# Patient Record
Sex: MALE
[De-identification: names, ages, dates, MRNs, and addresses within clinical notes are randomized per-mention and may not be internally consistent; named-entity substitution may affect disease eponyms.]

---

## 2019-12-26 ENCOUNTER — HOSPITAL ENCOUNTER (INPATIENT)
Dept: HOSPITAL 92 - ERS | Age: 19
LOS: 4 days | Discharge: HOME | DRG: 918 | End: 2019-12-30
Attending: EMERGENCY MEDICINE | Admitting: INTERNAL MEDICINE
Payer: MEDICAID

## 2019-12-26 VITALS — BODY MASS INDEX: 19.5 KG/M2

## 2019-12-26 DIAGNOSIS — E87.6: ICD-10-CM

## 2019-12-26 DIAGNOSIS — E87.2: ICD-10-CM
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
## 2019-12-26 DIAGNOSIS — F32.9: ICD-10-CM

## 2019-12-26 DIAGNOSIS — K75.89: ICD-10-CM

## 2019-12-26 DIAGNOSIS — T39.1X2A: Primary | ICD-10-CM

## 2019-12-26 DIAGNOSIS — R74.0: ICD-10-CM

## 2019-12-26 DIAGNOSIS — D68.9: ICD-10-CM

## 2019-12-26 DIAGNOSIS — D72.829: ICD-10-CM

## 2019-12-26 DIAGNOSIS — E86.0: ICD-10-CM

## 2019-12-26 LAB
ALBUMIN SERPL BCG-MCNC: 3.9 G/DL (ref 3.5–5)
ALBUMIN SERPL BCG-MCNC: 4.2 G/DL (ref 3.5–5)
ALP SERPL-CCNC: 49 U/L (ref 40–110)
ALP SERPL-CCNC: 61 U/L (ref 40–110)
ALT SERPL W P-5'-P-CCNC: 140 U/L (ref 8–55)
ALT SERPL W P-5'-P-CCNC: 56 U/L (ref 8–55)
ANION GAP SERPL CALC-SCNC: 12 MMOL/L (ref 10–20)
ANION GAP SERPL CALC-SCNC: 15 MMOL/L (ref 10–20)
APAP SERPL-MCNC: 35 MCG/ML (ref 10–30)
APTT PPP: 32.6 SEC (ref 22.9–36.1)
AST SERPL-CCNC: 34 U/L (ref 5–34)
AST SERPL-CCNC: 90 U/L (ref 5–34)
BASOPHILS # BLD AUTO: 0 THOU/UL (ref 0–0.2)
BASOPHILS NFR BLD AUTO: 0.1 % (ref 0–1)
BILIRUB SERPL-MCNC: 1.1 MG/DL (ref 0.2–1.2)
BILIRUB SERPL-MCNC: 2.4 MG/DL (ref 0.2–1.2)
BUN SERPL-MCNC: 10 MG/DL (ref 8.9–20.6)
BUN SERPL-MCNC: 12 MG/DL (ref 8.9–20.6)
CALCIUM SERPL-MCNC: 7.9 MG/DL (ref 7.8–10.44)
CALCIUM SERPL-MCNC: 8.7 MG/DL (ref 7.8–10.44)
CHLORIDE SERPL-SCNC: 107 MMOL/L (ref 98–107)
CHLORIDE SERPL-SCNC: 113 MMOL/L (ref 98–107)
CO2 SERPL-SCNC: 19 MMOL/L (ref 22–29)
CO2 SERPL-SCNC: 23 MMOL/L (ref 22–29)
CREAT CL PREDICTED SERPL C-G-VRATE: 119 ML/MIN (ref 70–130)
CREAT CL PREDICTED SERPL C-G-VRATE: 128 ML/MIN (ref 70–130)
EOSINOPHIL # BLD AUTO: 0.1 THOU/UL (ref 0–0.7)
EOSINOPHIL NFR BLD AUTO: 0.5 % (ref 0–10)
GLOBULIN SER CALC-MCNC: 2.2 G/DL (ref 2.4–3.5)
GLOBULIN SER CALC-MCNC: 2.6 G/DL (ref 2.4–3.5)
GLUCOSE SERPL-MCNC: 106 MG/DL (ref 70–105)
GLUCOSE SERPL-MCNC: 97 MG/DL (ref 70–105)
HGB BLD-MCNC: 16.7 G/DL (ref 14–18)
INR PPP: 1.4
INR PPP: 1.6
LYMPHOCYTES # BLD: 2 THOU/UL (ref 1.2–3.4)
LYMPHOCYTES NFR BLD AUTO: 12.9 % (ref 21–51)
MCH RBC QN AUTO: 31.6 PG (ref 27–31)
MCV RBC AUTO: 92.1 FL (ref 78–98)
MONOCYTES # BLD AUTO: 1.3 THOU/UL (ref 0.11–0.59)
MONOCYTES NFR BLD AUTO: 8.5 % (ref 0–10)
NEUTROPHILS # BLD AUTO: 12.4 THOU/UL (ref 1.4–6.5)
NEUTROPHILS NFR BLD AUTO: 78 % (ref 42–75)
PLATELET # BLD AUTO: 346 THOU/UL (ref 130–400)
POTASSIUM SERPL-SCNC: 3.7 MMOL/L (ref 3.5–5.1)
POTASSIUM SERPL-SCNC: 4.3 MMOL/L (ref 3.5–5.1)
PROTHROMBIN TIME: 16.7 SEC (ref 12–14.7)
PROTHROMBIN TIME: 19.3 SEC (ref 12–14.7)
RBC # BLD AUTO: 5.3 MILL/UL (ref 4.7–6.1)
SALICYLATES SERPL-MCNC: (no result) MG/DL (ref 15–30)
SODIUM SERPL-SCNC: 140 MMOL/L (ref 136–145)
SODIUM SERPL-SCNC: 141 MMOL/L (ref 136–145)
WBC # BLD AUTO: 15.9 THOU/UL (ref 4.8–10.8)

## 2019-12-26 PROCEDURE — 96365 THER/PROPH/DIAG IV INF INIT: CPT

## 2019-12-26 PROCEDURE — S0028 INJECTION, FAMOTIDINE, 20 MG: HCPCS

## 2019-12-26 PROCEDURE — 85610 PROTHROMBIN TIME: CPT

## 2019-12-26 PROCEDURE — 36415 COLL VENOUS BLD VENIPUNCTURE: CPT

## 2019-12-26 PROCEDURE — 82140 ASSAY OF AMMONIA: CPT

## 2019-12-26 PROCEDURE — 96366 THER/PROPH/DIAG IV INF ADDON: CPT

## 2019-12-26 PROCEDURE — 80048 BASIC METABOLIC PNL TOTAL CA: CPT

## 2019-12-26 PROCEDURE — 80076 HEPATIC FUNCTION PANEL: CPT

## 2019-12-26 PROCEDURE — 85730 THROMBOPLASTIN TIME PARTIAL: CPT

## 2019-12-26 PROCEDURE — 80307 DRUG TEST PRSMV CHEM ANLYZR: CPT

## 2019-12-26 PROCEDURE — 85025 COMPLETE CBC W/AUTO DIFF WBC: CPT

## 2019-12-26 PROCEDURE — 80053 COMPREHEN METABOLIC PANEL: CPT

## 2019-12-26 RX ADMIN — ACETYLCYSTEINE SCH MG: 200 SOLUTION ORAL; RESPIRATORY (INHALATION) at 19:03

## 2019-12-26 RX ADMIN — ACETYLCYSTEINE SCH MG: 200 SOLUTION ORAL; RESPIRATORY (INHALATION) at 16:31

## 2019-12-26 RX ADMIN — FAMOTIDINE SCH: 10 INJECTION, SOLUTION INTRAVENOUS at 20:15

## 2019-12-26 RX ADMIN — THERA TABS SCH TAB: TAB at 09:39

## 2019-12-26 RX ADMIN — ACETYLCYSTEINE SCH MG: 200 SOLUTION ORAL; RESPIRATORY (INHALATION) at 23:41

## 2019-12-26 RX ADMIN — FAMOTIDINE SCH: 10 INJECTION, SOLUTION INTRAVENOUS at 09:40

## 2019-12-26 RX ADMIN — ACETYLCYSTEINE SCH MG: 200 SOLUTION ORAL; RESPIRATORY (INHALATION) at 12:56

## 2019-12-26 NOTE — CON
DATE OF CONSULTATION:  12/26/2019



CHIEF COMPLAINT:  Tylenol overdose.



HISTORY OF PRESENT ILLNESS:  Mr. Pimentel is a 19-year-old man who states that he drank

a bottle of NyQuil 2 nights ago and then took 5-10 Tylenol pills.  He woke up the

next morning and was in despair over breaking up with his girlfriend and he then

progressed to take the rest of the Tylenol bottle at 1:30 yesterday afternoon.  As

the evening progressed after that he developed lower abdominal cramping pain, nausea

and vomiting and so went on to the emergency room about 11:30.  Tylenol level at

12:30 a.m. this morning was 95, times that was 11 hours after ingestion.  Tylenol.

Tylenol level at 6:30 a.m. this morning, which was 15 hours after ingestion was at

level of 35.  The patient has had some tenderness over the right upper quadrant.  He

had one running bowel movement yesterday.  No blood in the stool.  No bloody emesis.

 No further vomiting today. 



PAST MEDICAL HISTORY:  Depression and prior suicide attempt with acetaminophen in

the past. 



PAST SURGICAL HISTORY:  

1. Foot surgery.

2. An implant over his left cheekbone.  

3. He has had LASIK eye surgery.



FAMILY HISTORY:  Negative for GI malignancy or liver disease.



SOCIAL HISTORY:  No alcohol, tobacco, or drugs.



ALLERGIES:  NO KNOWN DRUG ALLERGIES.



MEDICATIONS:  As an outpatient prior to admission, none.



REVIEW OF SYSTEMS:  Negative x10 systems reviewed, except as stated in history of

present illness. 



PHYSICAL EXAMINATION:

VITAL SIGNS:  Temperature 98.4, pulse 76, blood pressure 139/77. 

GENERAL:  He is in no acute distress.  Alert and oriented x3. 

HEENT:  His eyes have no scleral icterus.  Oropharynx is clear without lesions.  No

asterixis. 

LUNGS:  Clear to auscultation bilaterally. 

HEART:  Regular rate and rhythm without murmur. 

ABDOMEN:  Soft.  He has tenderness in the epigastric region and right upper quadrant

without guarding.  His bowel sounds are present. 

NEUROLOGICAL:  Mental status is alert and oriented.



LABORATORY DATA:  Again acetaminophen level at 6:30 this morning was 35.

Acetaminophen level at 12:30 a.m. was 95.  Tylenol ingestion occurred at 1:30 p.m.

INR was 1.6 at 6:30 this morning.  Creatinine 0.73, bilirubin 2.4, AST 34, ALT 56,

albumin 3.9. 



IMPRESSION:  

1. Acute liver toxicity secondary to acetaminophen overdose taken for suicide

attempt after a break-up with his girlfriend.  He ingested some 2 nights ago and

then the majority of the acetaminophen yesterday at 1:30.  The acetaminophen

nomogram is consistent with probable hepatotoxicity based on a level of 95 at 17

hours after ingestion at a level of 35.  Concerning findings now are his elevated

INR at 1.6 and elevated bilirubin.  His transaminases are not significantly

elevated.  However, these may not directly correlate with the degree of injury.  At

this point, the important factors will be monitoring his mental status and the trend

of his INR and bilirubin.  I will recheck these labs now.  If they are increasing,

then we will need to have a low threshold for transfer to the ICU and transfer to a

liver transplant center. 



RECOMMENDATIONS:  

1. CBC, CMP, PT/INR now.

2. We will follow the trend of these labs and monitor his mental status.

3. Please note that the patient was started on oral N-acetylcysteine in [place]

prior to transfer.  He is now continuing to receive oral acetylcysteine q.4 hours.

He will need to complete the course of the Mucomyst. 





Job ID:  [id number]

## 2019-12-26 NOTE — HP
PRIMARY CARE PHYSICIAN:  [facility].



CHIEF COMPLAINT:  Drug overdose.



HISTORY OF PRESENT ILLNESS:  The patient is a 19-year-old male who presented to

[facility] after a drug overdose.  The patient had an argument 
with

his long-term girlfriend of 4 years, and he wanted to kill himself.  He has

swallowed almost the whole bottle of Tylenol.  Per ER record from [facility], he 
took

approximately 02979 mg of Tylenol.  He drove himself to the emergency room 
because

he does not want to die.  He has mild nausea at this time; however, denies any 
pain.

 No chest pain or palpitations reported.  He denies any other drug use. 



At [facility], his initial vital signs showed a pulse rate of 97, respirations 
of 16,

and blood pressure 128/78.  The patient was afebrile.  Please note that he 
swallowed

Tylenol around 1:30 p.m. yesterday.  His Tylenol level at [facility] at 0033 
hours

was 95.  He received 21 mL of 20% acetylcysteine solution and was transferred to

this facility. 



PAST MEDICAL HISTORY:  Reviewed with the patient.

1. Depression.

2. History of suicide attempt with Tylenol in the past.  He also has history of

inpatient psychiatry admissions. 



PAST SURGICAL HISTORY:  

1. Right foot surgery.

2. Left eye implant.



ALLERGIES:  NO KNOWN DRUG ALLERGIES.



CURRENT MEDICATIONS:  Reviewed with the patient and none.



SOCIAL HISTORY:  The patient denies any tobacco, alcohol, or drug use.  He 
currently

lives at home. 



FAMILY HISTORY:  Negative for heart disease.



REVIEW OF SYSTEMS:  All other review of systems were reviewed and were found

negative. 



PHYSICAL EXAMINATION:

VITAL SIGNS:  Current vital signs; temperature 98.4, respirations 17, pulse of 
81,

blood pressure of 138/73, and O2 saturation 98% on room air. 

GENERAL:  A 19-year-old male in no apparent distress.  Nausea, improving. 

HEENT:  Head, atraumatic and normocephalic.  Sclerae anicteric.  Moist mucous

membranes.  No oral lesion. 

NECK:  Supple.  No JVD.  No carotid bruit. 

LUNGS:  Clear to auscultation bilaterally.  No wheezing, rales, or rhonchi. 

HEART:  S1, S2 present.  Regular rate and rhythm.  No rubs or gallops. 

ABDOMEN:  Soft, nontender.  Bowel sounds present.  No rebound or guarding. 

EXTREMITIES:  No edema or calf tenderness. 

NEUROLOGIC:  Grossly nonfocal.  Moves all 4 extremities. 

PSYCHIATRY:  Alert, awake, and oriented x3.  Flat affect. 

SKIN:  Warm and dry. 

LYMPH NODES:  No palpable lymph nodes in the neck.



LABORATORY DATA:  Labs from [facility] at 0033 hours; sodium 140, potassium 3.5,

chloride 104, bicarb 16, glucose of 97, BUN of 11, and creatinine of 1. 



Total bilirubin 1.8, alkaline phosphatase 60, AST of 39, ALT of 67.  Lactic 
acid was

5.  WBC 14.6 with hemoglobin 17, hematocrit 49, and platelets of 407. 



PT of 15.7, INR 1.2, and PTT 27.5.  Urine drug screen was positive for benzos.

Tylenol level 95.  Alcohol level was negative.  Salicylate was negative.  
Urinalysis

was negative for wbc or bacteria. 



EKG by my review showed sinus rhythm. 



His Tylenol level at this facility was 35.  His PT was 19.3 with INR of 1.6 at 
this

facility. 



Total bilirubin at this facility was 2.4.



IMPRESSION:  

1. Drug overdose with acetaminophen/suicidal attempt.

2. History of suicidal attempt with Tylenol in the past.

3. Dehydration with lactic acidosis, secondary to persistent nausea, vomiting.

4. Anion gap metabolic acidosis, secondary to lactic acidosis.  Lactic acid 
improved

to 2.0. 

5. Leukocytosis, unlikely to be infectious.

6. Abnormal LFTs with coagulopathy, probably secondary to hepatotoxicity.



PLAN:  The patient will be monitored on the medical floor.  The patient has been

started on acetylcysteine protocol.  He has mild nausea; however, denies

any vomiting.  We will continue the oral protocol that was started at 
[facility].  We

will consult Gastroenterology due to abnormal LFTs.  We will recheck LFTs and

Tylenol level in a.m.  PT/INR in a.m. Poison control was called from [facility].

Sitter with suicide precautions. IV hydration.





Job ID:  [id number]



[facility]

## 2019-12-27 LAB
ALBUMIN SERPL BCG-MCNC: 3.9 G/DL (ref 3.5–5)
ALP SERPL-CCNC: 51 U/L (ref 40–110)
ALT SERPL W P-5'-P-CCNC: 161 U/L (ref 8–55)
ANION GAP SERPL CALC-SCNC: 12 MMOL/L (ref 10–20)
APAP SERPL-MCNC: (no result) MCG/ML (ref 10–30)
APTT PPP: 31.9 SEC (ref 22.9–36.1)
AST SERPL-CCNC: 85 U/L (ref 5–34)
BASOPHILS # BLD AUTO: 0.1 THOU/UL (ref 0–0.2)
BASOPHILS NFR BLD AUTO: 0.4 % (ref 0–1)
BILIRUB DIRECT SERPL-MCNC: 0.4 MG/DL (ref 0.1–0.3)
BILIRUB SERPL-MCNC: 0.8 MG/DL (ref 0.2–1.2)
BUN SERPL-MCNC: 7 MG/DL (ref 8.9–20.6)
CALCIUM SERPL-MCNC: 8.3 MG/DL (ref 7.8–10.44)
CHLORIDE SERPL-SCNC: 109 MMOL/L (ref 98–107)
CO2 SERPL-SCNC: 23 MMOL/L (ref 22–29)
CREAT CL PREDICTED SERPL C-G-VRATE: 130 ML/MIN (ref 70–130)
EOSINOPHIL # BLD AUTO: 0.1 THOU/UL (ref 0–0.7)
EOSINOPHIL NFR BLD AUTO: 0.6 % (ref 0–10)
GLUCOSE SERPL-MCNC: 114 MG/DL (ref 70–105)
HGB BLD-MCNC: 15.8 G/DL (ref 14–18)
INR PPP: 1.3
LYMPHOCYTES # BLD: 2.1 THOU/UL (ref 1.2–3.4)
LYMPHOCYTES NFR BLD AUTO: 15 % (ref 21–51)
MCH RBC QN AUTO: 31.4 PG (ref 27–31)
MCV RBC AUTO: 91.7 FL (ref 78–98)
MONOCYTES # BLD AUTO: 1 THOU/UL (ref 0.11–0.59)
MONOCYTES NFR BLD AUTO: 7.4 % (ref 0–10)
NEUTROPHILS # BLD AUTO: 10.7 THOU/UL (ref 1.4–6.5)
NEUTROPHILS NFR BLD AUTO: 76.7 % (ref 42–75)
PLATELET # BLD AUTO: 292 THOU/UL (ref 130–400)
POTASSIUM SERPL-SCNC: 3.6 MMOL/L (ref 3.5–5.1)
PROTHROMBIN TIME: 16.3 SEC (ref 12–14.7)
RBC # BLD AUTO: 5.02 MILL/UL (ref 4.7–6.1)
SODIUM SERPL-SCNC: 140 MMOL/L (ref 136–145)
WBC # BLD AUTO: 13.9 THOU/UL (ref 4.8–10.8)

## 2019-12-27 RX ADMIN — ONDANSETRON PRN MG: 2 INJECTION INTRAMUSCULAR; INTRAVENOUS at 08:37

## 2019-12-27 RX ADMIN — ACETYLCYSTEINE SCH MG: 200 SOLUTION ORAL; RESPIRATORY (INHALATION) at 11:56

## 2019-12-27 RX ADMIN — ONDANSETRON PRN MG: 2 INJECTION INTRAMUSCULAR; INTRAVENOUS at 15:02

## 2019-12-27 RX ADMIN — ACETYLCYSTEINE SCH MG: 200 SOLUTION ORAL; RESPIRATORY (INHALATION) at 19:39

## 2019-12-27 RX ADMIN — ACETYLCYSTEINE SCH MG: 200 SOLUTION ORAL; RESPIRATORY (INHALATION) at 23:56

## 2019-12-27 RX ADMIN — THERA TABS SCH: TAB at 11:18

## 2019-12-27 RX ADMIN — ACETYLCYSTEINE SCH MG: 200 SOLUTION ORAL; RESPIRATORY (INHALATION) at 06:56

## 2019-12-27 RX ADMIN — FAMOTIDINE SCH MG: 10 INJECTION, SOLUTION INTRAVENOUS at 08:37

## 2019-12-27 RX ADMIN — ACETYLCYSTEINE SCH MG: 200 SOLUTION ORAL; RESPIRATORY (INHALATION) at 02:49

## 2019-12-27 RX ADMIN — FAMOTIDINE SCH: 10 INJECTION, SOLUTION INTRAVENOUS at 20:56

## 2019-12-27 RX ADMIN — ACETYLCYSTEINE SCH MG: 200 SOLUTION ORAL; RESPIRATORY (INHALATION) at 11:18

## 2019-12-27 RX ADMIN — ACETYLCYSTEINE SCH MG: 200 SOLUTION ORAL; RESPIRATORY (INHALATION) at 15:32

## 2019-12-27 NOTE — PRG
DATE OF SERVICE:  12/27/2019



SUBJECTIVE:  The patient reports having nausea from the medication.  There is no

vomiting.  He is alert and lucid. 



OBJECTIVE:  VITAL SIGNS:  Temperature is 98.3, blood pressure 133/69, and pulse of

97. 

GENERAL:  He is alert, lying in bed, in no distress. 

HEENT:  Shows anicteric sclerae. 

CV:  Shows normal S1 and S2.  Regular rate and rhythm. 

CHEST:  Shows a breath sounds. 

ABDOMEN:  Soft.  There is right upper quadrant tenderness.  He has active bowel

sounds.  No peritoneal sign. 

EXTREMITIES:  Shows no edema.



LABORATORY DATA:  Electrolytes within normal range.  Creatinine 0.72, bilirubin 0.8,

alkaline phosphatase 51, , AST of 85, and serum ammonia of 32, PT of 16.3 and

INR of 1.3. 



ASSESSMENT:  

1. Tylenol overdose, status post 7/17 doses of Mucomyst.

2. Liver synthetic function appears to be intact, and the INR is trending down.

Bilirubin is down with mild elevation of his liver enzymes.  No evidence of severe

hepatonecrosis or encephalopathy.  Overall, the patient is improving. 



RECOMMENDATION:  

1. Continue to finish out the acetylcysteine treatment.

2. Advanced diet as tolerated.

3. Anticipate good prognosis.  Dr. Pritchard to cover for GI Service this weekend.







Job ID:  [id number]

## 2019-12-27 NOTE — PDOC.HOSPP
- Subjective


Encounter Date: 12/27/19


Encounter Time: 08:30


Subjective: 





Patient seen and examined for OD. Nausea. Dark stool earlier. No new 

complaints. No overnight events





- Objective


Vital Signs & Weight: 


 Vital Signs (12 hours)











  Temp Pulse Resp BP BP Pulse Ox


 


 12/27/19 12:00  98.3 F  97  18   133/69  99


 


 12/27/19 07:58       100


 


 12/27/19 07:46  98.5 F  77  16   131/60  100


 


 12/27/19 04:46  98.4 F  78  16  138/72   97








 Weight











Weight                         125 lb 0.034 oz














I&O: 


 











 12/26/19 12/27/19 12/28/19





 06:59 06:59 06:59


 


Intake Total  1680 


 


Balance  1680 











Result Diagrams: 


 12/27/19 04:40





 12/27/19 04:40


Additional Labs: 





 Laboratory Tests











  12/26/19 12/26/19 12/26/19





  06:27 06:27 20:57


 


Hgb   


 


INR    1.4


 


Total Bilirubin  2.4 H  


 


Direct Bilirubin   


 


AST   


 


ALT   


 


Acetaminophen   35.0 H 














  12/27/19 12/27/19 12/27/19





  04:40 04:40 04:40


 


Hgb   15.8 


 


INR    1.3


 


Total Bilirubin   


 


Direct Bilirubin   


 


AST   


 


ALT   


 


Acetaminophen  Less than  6.0 L  














  12/27/19





  04:40


 


Hgb 


 


INR 


 


Total Bilirubin  0.8


 


Direct Bilirubin  0.4 H


 


AST  85 H


 


ALT  161 H


 


Acetaminophen 














Hospitalist ROS





- Review of Systems


Respiratory: denies: cough, dry, shortness of breath, hemoptysis, SOB with 

excertion, pleuritic pain, sputum, wheezing, other


Cardiovascular: denies: chest pain, palpitations, orthopnea, paroxysmal noc. 

dyspnea, edema, light headedness, other





- Medication


Medications: 


Active Medications











Generic Name Dose Route Start Last Admin





  Trade Name Freq  PRN Reason Stop Dose Admin


 


Acetylcysteine  3,970 mg  12/26/19 11:00  12/27/19 11:56





  Mucomyst 20%  PO   3,970 mg





  Q4H RUBY   Administration





     





     





     





     


 


Calcium Carbonate  1,000 mg  12/26/19 08:18  12/26/19 17:17





  Tums  PO   1,000 mg





  Q4H PRN   Administration





  Heartburn  or Indigestion   





     





     





     


 


Famotidine  20 mg  12/26/19 09:00  12/27/19 08:37





  Pepcid  SLOW IVP   20 mg





  Q12HR RUBY   Administration





     





     





     





     


 


Famotidine  20 mg  12/26/19 09:00  12/27/19 09:33





  Pepcid  PO   Not Given





  BID RUBY   





     





     





     





     


 


Folic Acid  1 mg  12/26/19 09:00  12/27/19 11:18





  Folvite  PO   Not Given





  DAILY RUBY   





     





     





     





     


 


Promethazine HCl 25 mg/ Sodium  51 mls @ 102 mls/hr  12/26/19 08:22  12/27/19 10

:33





  Chloride  IVPB   51 mls





  Q6H PRN   Administration





  Nausea   





     





     





     


 


Dextrose/Sodium Chloride  1,000 mls @ 100 mls/hr  12/26/19 11:15  12/27/19 06:58





  D5 1/2 Ns  IV   1,000 mls





  .Q10H RUBY   Administration





     





     





     





     


 


Multivitamins  1 tab  12/26/19 09:00  12/27/19 11:18





  Theragran  PO   Not Given





  DAILY RUBY   





     





     





     





     


 


Ondansetron HCl  4 mg  12/26/19 08:18  12/27/19 08:37





  Zofran  IVP   4 mg





  Q6H PRN   Administration





  Nausea/Vomiting   





     





     





     


 


Phytonadione  5 mg  12/26/19 09:00  12/27/19 10:38





  Aquamephyton  PO  12/27/19 23:59  5 mg





  DAILY RUBY   Administration





     





     





     





     


 


Tramadol HCl  50 mg  12/26/19 13:33  12/26/19 15:38





  Ultram  PO   50 mg





  Q8H PRN   Administration





  Moderate Pain (4-6)   





     





     





     














- Exam


General Appearance: NAD


Neck: supple, no JVD


Heart: RRR, no gallops


Respiratory: CTAB, no rales


Gastrointestinal: soft, normal bowel sounds, no guarding, no rigidity


Gastrointestinal - other findings: mild gen tend


Extremities: no edema


Neurological: no new deficit





Hosp A/P





- Plan


DVT proph w/SCDs





Drug overdose with acetaminophen/suicidal attempt.


Dehydration with lactic acidosis, secondary to persistent nausea, vomiting.


Anion gap metabolic acidosis, secondary to lactic acidosis. 


Leukocytosis, unlikely to be infectious.


Abnormal LFTs with coagulopathy, probably secondary to hepatotoxicity.


History of suicidal attempt with Tylenol in the past.





PLAN:  


Cont acetylcysteine protocol


Bilirubin/Coagulopathy improving


GI input appreciated


Cont Sitter with Suicide precautions


AM labs

## 2019-12-28 LAB
ALBUMIN SERPL BCG-MCNC: 4.1 G/DL (ref 3.5–5)
ALP SERPL-CCNC: 52 U/L (ref 40–110)
ALT SERPL W P-5'-P-CCNC: 352 U/L (ref 8–55)
ANION GAP SERPL CALC-SCNC: 12 MMOL/L (ref 10–20)
APTT PPP: 29.3 SEC (ref 22.9–36.1)
AST SERPL-CCNC: 153 U/L (ref 5–34)
BASOPHILS # BLD AUTO: 0 THOU/UL (ref 0–0.2)
BASOPHILS NFR BLD AUTO: 0.3 % (ref 0–1)
BILIRUB SERPL-MCNC: 1.3 MG/DL (ref 0.2–1.2)
BUN SERPL-MCNC: 4 MG/DL (ref 8.9–20.6)
CALCIUM SERPL-MCNC: 8.9 MG/DL (ref 7.8–10.44)
CHLORIDE SERPL-SCNC: 107 MMOL/L (ref 98–107)
CO2 SERPL-SCNC: 26 MMOL/L (ref 22–29)
CREAT CL PREDICTED SERPL C-G-VRATE: 123 ML/MIN (ref 70–130)
EOSINOPHIL # BLD AUTO: 0.1 THOU/UL (ref 0–0.7)
EOSINOPHIL NFR BLD AUTO: 0.8 % (ref 0–10)
GLOBULIN SER CALC-MCNC: 2.5 G/DL (ref 2.4–3.5)
GLUCOSE SERPL-MCNC: 89 MG/DL (ref 70–105)
HGB BLD-MCNC: 15.6 G/DL (ref 14–18)
INR PPP: 1.1
LYMPHOCYTES # BLD: 1.4 THOU/UL (ref 1.2–3.4)
LYMPHOCYTES NFR BLD AUTO: 10.4 % (ref 21–51)
MCH RBC QN AUTO: 31 PG (ref 27–31)
MCV RBC AUTO: 91.8 FL (ref 78–98)
MONOCYTES # BLD AUTO: 1.4 THOU/UL (ref 0.11–0.59)
MONOCYTES NFR BLD AUTO: 9.9 % (ref 0–10)
NEUTROPHILS # BLD AUTO: 10.8 THOU/UL (ref 1.4–6.5)
NEUTROPHILS NFR BLD AUTO: 78.6 % (ref 42–75)
PLATELET # BLD AUTO: 284 THOU/UL (ref 130–400)
POTASSIUM SERPL-SCNC: 3.1 MMOL/L (ref 3.5–5.1)
PROTHROMBIN TIME: 14.4 SEC (ref 12–14.7)
RBC # BLD AUTO: 5.03 MILL/UL (ref 4.7–6.1)
SODIUM SERPL-SCNC: 142 MMOL/L (ref 136–145)
WBC # BLD AUTO: 13.7 THOU/UL (ref 4.8–10.8)

## 2019-12-28 RX ADMIN — ACETYLCYSTEINE SCH MG: 200 SOLUTION ORAL; RESPIRATORY (INHALATION) at 16:32

## 2019-12-28 RX ADMIN — ACETYLCYSTEINE SCH MG: 200 SOLUTION ORAL; RESPIRATORY (INHALATION) at 20:19

## 2019-12-28 RX ADMIN — FAMOTIDINE SCH: 10 INJECTION, SOLUTION INTRAVENOUS at 09:07

## 2019-12-28 RX ADMIN — THERA TABS SCH TAB: TAB at 09:06

## 2019-12-28 RX ADMIN — FAMOTIDINE SCH: 10 INJECTION, SOLUTION INTRAVENOUS at 20:21

## 2019-12-28 RX ADMIN — ACETYLCYSTEINE SCH MG: 200 SOLUTION ORAL; RESPIRATORY (INHALATION) at 06:09

## 2019-12-28 RX ADMIN — ONDANSETRON PRN MG: 2 INJECTION INTRAMUSCULAR; INTRAVENOUS at 09:10

## 2019-12-28 RX ADMIN — ACETYLCYSTEINE SCH MG: 200 SOLUTION ORAL; RESPIRATORY (INHALATION) at 03:04

## 2019-12-28 RX ADMIN — ACETYLCYSTEINE SCH MG: 200 SOLUTION ORAL; RESPIRATORY (INHALATION) at 11:26

## 2019-12-28 NOTE — PRG
DATE OF SERVICE:  12/28/2019



SUBJECTIVE:  This is a 19-year-old male with Tylenol overdose.  He is on Mucomyst

every 3 to 4 hours.  His appetite remains poor.  He is still complaining of nausea.

No vomiting.  He is not eating very well.  However, he has no abdominal pain. 



LABORATORY DATA:  His laboratory data from today show WBC of 13,700, hemoglobin of

15.6, hematocrit of 46.2.  Coagulation profile is normal; PT is 14.4, INR is 1.1.

Chemistry panel shows liver function tests trending up.  Bilirubin 1.3; ,

gone up from 85; ; alkaline phosphatase 52. 



OBJECTIVE:  GENERAL:  Appears comfortable, in no acute distress. 

VITAL SIGNS:  Afebrile, pulse is 84, blood pressure 133/73. 

CARDIOVASCULAR:  Within normal limits. 

LUNGS:  Within normal limits. 

ABDOMEN:  Soft.  Abdomen is nontender.  No organomegaly.  No masses.  Bowel sounds

normal. 

EXTREMITIES:  Reveal no edema.



RECOMMENDATIONS:  

1. Continue Mucomyst.

2. Increase oral intake.

3. Follow up LFTs.







Job ID:  [id number]

## 2019-12-28 NOTE — PDOC.HOSPP
- Subjective


Encounter Date: 12/28/19


Encounter Time: 14:48


Subjective: 





Mr. Pimentel was seen today in follow-up of Tylenol overdose. He says he continues 

to note nausea, and difficulty eating. He has only had a few crackers in the 

past 2 days. 





- Objective


Vital Signs & Weight: 


 Vital Signs (12 hours)











  Temp Pulse Resp BP Pulse Ox


 


 12/28/19 08:36  99.5 F  84  16  133/73  98


 


 12/28/19 08:00      98








 Weight











Admit Weight                   125 lb 0.034 oz


 


Weight                         125 lb 0.034 oz














I&O: 


 











 12/27/19 12/28/19 12/29/19





 06:59 06:59 06:59


 


Intake Total 1680 1440 


 


Balance 1680 1440 











Result Diagrams: 


 12/28/19 06:07





 12/28/19 06:07





Hospitalist ROS





- Medication


Medications: 


Active Medications











Generic Name Dose Route Start Last Admin





  Trade Name Freq  PRN Reason Stop Dose Admin


 


Acetylcysteine  3,970 mg  12/26/19 11:00  12/28/19 11:26





  Mucomyst 20%  PO   3,970 mg





  Q4H RUBY   Administration





     





     





     





     


 


Calcium Carbonate  1,000 mg  12/26/19 08:18  12/26/19 17:17





  Tums  PO   1,000 mg





  Q4H PRN   Administration





  Heartburn  or Indigestion   





     





     





     


 


Docusate Sodium  100 mg  12/27/19 21:00  12/28/19 09:06





  Colace  PO   100 mg





  BID RUBY   Administration





     





     





     





     


 


Famotidine  20 mg  12/26/19 09:00  12/28/19 09:07





  Pepcid  SLOW IVP   Not Given





  Q12HR RUBY   





     





     





     





     


 


Famotidine  20 mg  12/26/19 09:00  12/28/19 09:06





  Pepcid  PO   20 mg





  BID RUBY   Administration





     





     





     





     


 


Folic Acid  1 mg  12/26/19 09:00  12/28/19 09:06





  Folvite  PO   1 mg





  DAILY RUBY   Administration





     





     





     





     


 


Promethazine HCl 25 mg/ Sodium  51 mls @ 102 mls/hr  12/26/19 08:22  12/27/19 10

:33





  Chloride  IVPB   51 mls





  Q6H PRN   Administration





  Nausea   





     





     





     


 


Dextrose/Sodium Chloride  1,000 mls @ 100 mls/hr  12/26/19 11:15  12/28/19 06:08





  D5 1/2 Ns  IV   1,000 mls





  .Q10H RUBY   Administration





     





     





     





     


 


Multivitamins  1 tab  12/26/19 09:00  12/28/19 09:06





  Theragran  PO   1 tab





  DAILY RUBY   Administration





     





     





     





     


 


Ondansetron HCl  4 mg  12/26/19 08:18  12/28/19 03:06





  Zofran Odt  PO   4 mg





  Q6H PRN   Administration





  Nausea/Vomiting   





     





     





     


 


Ondansetron HCl  4 mg  12/26/19 08:18  12/28/19 09:10





  Zofran  IVP   4 mg





  Q6H PRN   Administration





  Nausea/Vomiting   





     





     





     


 


Promethazine HCl  25 mg  12/26/19 08:22  12/27/19 20:51





  Phenergan  PO   25 mg





  Q4H PRN   Administration





  Nausea   





     





     





     


 


Tramadol HCl  50 mg  12/26/19 13:33  12/26/19 15:38





  Ultram  PO   50 mg





  Q8H PRN   Administration





  Moderate Pain (4-6)   





     





     





     














- Exam


Eye: PERRL


Heart: RRR, no murmur, no gallops, no rubs, normal peripheral pulses


Respiratory: CTAB, no wheezes, no rales, no ronchi, normal chest expansion, no 

tachypnea, normal percussion


Gastrointestinal: soft, non-distended, normal bowel sounds (+ mild diffuse 

tenderness no rebound or guarding)





Hosp A/P


(1) Acetaminophen overdose


Code(s): T39.1X1A - POISONING BY 4-AMINOPHENOL DERIVATIVES, ACCIDENTAL, INIT   

Status: Acute   





(2) Depression


Code(s): F32.9 - MAJOR DEPRESSIVE DISORDER, SINGLE EPISODE, UNSPECIFIED   Status

: Chronic   





- Plan





* Suicide attempt with Acetaminophen overdose


* Continue Mucomyst


* Supportive care


* Suicide precautions

## 2019-12-29 LAB
ALBUMIN SERPL BCG-MCNC: 3.8 G/DL (ref 3.5–5)
ALP SERPL-CCNC: 45 U/L (ref 40–110)
ALT SERPL W P-5'-P-CCNC: 285 U/L (ref 8–55)
ANION GAP SERPL CALC-SCNC: 12 MMOL/L (ref 10–20)
APTT PPP: 30.7 SEC (ref 22.9–36.1)
AST SERPL-CCNC: 74 U/L (ref 5–34)
BILIRUB SERPL-MCNC: 1.2 MG/DL (ref 0.2–1.2)
BUN SERPL-MCNC: 4 MG/DL (ref 8.9–20.6)
CALCIUM SERPL-MCNC: 8.6 MG/DL (ref 7.8–10.44)
CHLORIDE SERPL-SCNC: 105 MMOL/L (ref 98–107)
CO2 SERPL-SCNC: 28 MMOL/L (ref 22–29)
CREAT CL PREDICTED SERPL C-G-VRATE: 117 ML/MIN (ref 70–130)
GLOBULIN SER CALC-MCNC: 2.5 G/DL (ref 2.4–3.5)
GLUCOSE SERPL-MCNC: 120 MG/DL (ref 70–105)
INR PPP: 1.1
POTASSIUM SERPL-SCNC: 2.9 MMOL/L (ref 3.5–5.1)
PROTHROMBIN TIME: 14.6 SEC (ref 12–14.7)
SODIUM SERPL-SCNC: 142 MMOL/L (ref 136–145)

## 2019-12-29 RX ADMIN — ACETYLCYSTEINE SCH MG: 200 SOLUTION ORAL; RESPIRATORY (INHALATION) at 00:13

## 2019-12-29 RX ADMIN — ONDANSETRON PRN MG: 2 INJECTION INTRAMUSCULAR; INTRAVENOUS at 00:43

## 2019-12-29 RX ADMIN — FAMOTIDINE SCH: 10 INJECTION, SOLUTION INTRAVENOUS at 20:29

## 2019-12-29 RX ADMIN — FAMOTIDINE SCH: 10 INJECTION, SOLUTION INTRAVENOUS at 09:00

## 2019-12-29 RX ADMIN — POTASSIUM CHLORIDE, DEXTROSE MONOHYDRATE AND SODIUM CHLORIDE SCH: 150; 5; 450 INJECTION, SOLUTION INTRAVENOUS at 16:58

## 2019-12-29 RX ADMIN — ONDANSETRON PRN MG: 2 INJECTION INTRAMUSCULAR; INTRAVENOUS at 08:56

## 2019-12-29 RX ADMIN — THERA TABS SCH TAB: TAB at 08:54

## 2019-12-29 RX ADMIN — ACETYLCYSTEINE SCH MG: 200 SOLUTION ORAL; RESPIRATORY (INHALATION) at 03:52

## 2019-12-29 RX ADMIN — POTASSIUM CHLORIDE, DEXTROSE MONOHYDRATE AND SODIUM CHLORIDE SCH MLS: 150; 5; 450 INJECTION, SOLUTION INTRAVENOUS at 06:38

## 2019-12-29 RX ADMIN — POTASSIUM CHLORIDE, DEXTROSE MONOHYDRATE AND SODIUM CHLORIDE SCH MLS: 150; 5; 450 INJECTION, SOLUTION INTRAVENOUS at 17:01

## 2019-12-29 NOTE — PDOC.HOSPP
- Subjective


Encounter Date: 12/29/19


Encounter Time: 13:54


Subjective: 





Mr. Pimentel was seen today in follow-up. He notes continued nausea, otherwise ok.





- Objective


Vital Signs & Weight: 


 Vital Signs (12 hours)











  Pulse Ox


 


 12/29/19 07:32  98


 


 12/29/19 05:30  99








 Weight











Admit Weight                   125 lb 0.034 oz


 


Weight                         125 lb 0.034 oz














I&O: 


 











 12/28/19 12/29/19 12/30/19





 06:59 06:59 06:59


 


Intake Total 1440  


 


Balance 1440  











Result Diagrams: 


 12/28/19 06:07





 12/29/19 04:26





Hospitalist ROS





- Medication


Medications: 


Active Medications











Generic Name Dose Route Start Last Admin





  Trade Name Freq  PRN Reason Stop Dose Admin


 


Calcium Carbonate  1,000 mg  12/26/19 08:18  12/26/19 17:17





  Tums  PO   1,000 mg





  Q4H PRN   Administration





  Heartburn  or Indigestion   





     





     





     


 


Docusate Sodium  100 mg  12/27/19 21:00  12/29/19 08:54





  Colace  PO   100 mg





  BID RUBY   Administration





     





     





     





     


 


Famotidine  20 mg  12/26/19 09:00  12/29/19 09:00





  Pepcid  SLOW IVP   Not Given





  Q12HR RUBY   





     





     





     





     


 


Famotidine  20 mg  12/26/19 09:00  12/29/19 08:54





  Pepcid  PO   20 mg





  BID RUBY   Administration





     





     





     





     


 


Folic Acid  1 mg  12/26/19 09:00  12/29/19 08:54





  Folvite  PO   1 mg





  DAILY RUBY   Administration





     





     





     





     


 


Promethazine HCl 25 mg/ Sodium  51 mls @ 102 mls/hr  12/26/19 08:22  12/27/19 10

:33





  Chloride  IVPB   51 mls





  Q6H PRN   Administration





  Nausea   





     





     





     


 


Potassium Chloride/Dextrose/Sod Cl  1,000 mls @ 100 mls/hr  12/29/19 06:30  12/ 29/19 06:38





  D5 1/2 Ns W/20 Meq Kcl  IV   1,000 mls





  .Q10H RUBY   Administration





     





     





     





     


 


Multivitamins  1 tab  12/26/19 09:00  12/29/19 08:54





  Theragran  PO   1 tab





  DAILY RUBY   Administration





     





     





     





     


 


Ondansetron HCl  4 mg  12/26/19 08:18  12/28/19 03:06





  Zofran Odt  PO   4 mg





  Q6H PRN   Administration





  Nausea/Vomiting   





     





     





     


 


Ondansetron HCl  4 mg  12/26/19 08:18  12/29/19 08:56





  Zofran  IVP   4 mg





  Q6H PRN   Administration





  Nausea/Vomiting   





     





     





     


 


Promethazine HCl  25 mg  12/26/19 08:22  12/29/19 03:54





  Phenergan  PO   25 mg





  Q4H PRN   Administration





  Nausea   





     





     





     


 


Tramadol HCl  50 mg  12/26/19 13:33  12/29/19 01:32





  Ultram  PO   50 mg





  Q8H PRN   Administration





  Moderate Pain (4-6)   





     





     





     














- Exam


Eye: PERRL


Heart: RRR, no murmur, no gallops, no rubs, normal peripheral pulses


Respiratory: CTAB, no wheezes, no rales, no ronchi, normal chest expansion


Gastrointestinal: soft, non-tender, normal bowel sounds, no palpable masses, no 

hepatomegaly, no guarding, no rigidity, tender to palpation (+ mild diffuse 

tenderness)


Extremities: no cyanosis, no edema





Hosp A/P


(1) Acetaminophen overdose


Code(s): T39.1X1A - POISONING BY 4-AMINOPHENOL DERIVATIVES, ACCIDENTAL, INIT   

Status: Acute   





(2) Depression


Code(s): F32.9 - MAJOR DEPRESSIVE DISORDER, SINGLE EPISODE, UNSPECIFIED   Status

: Chronic   





- Plan





* Suicide attempt with Acetaminophen overdose


* He has completed the course of mucomyst


* Liver function tests are trending down


* Hypokalemia- replace potassium ( low due to GI losses )


* Supportive care


* Suicide precautions

## 2019-12-29 NOTE — PRG
DATE OF SERVICE:  



SUBJECTIVE:  This is a 19-year-old male hospitalized for Tylenol overdose.  Liver

function tests are slightly elevated.  There is no abdominal pain __________ nausea

and poor appetite.  He has no vomiting.  He is not eating a whole lot. 



LABORATORY DATA:  WBC of 13,700, hemoglobin 15.6, hematocrit 46.2.  Chem-7 is

normal.  His potassium is slightly low at 2.9.  The liver function tests are

elevated but not very high.  Today, the AST is down to 74, , alkaline

phosphatase 45, bilirubin is 1.2.  His coagulation profile, PT and INR remain in the

normal range at 14.6 with INR 1.1. 



OBJECTIVE:  VITAL SIGNS:  He is afebrile.  Vital signs are stable.  Pulse is 78,

blood pressure 142/74. 

CARDIOVASCULAR:  Lungs within normal limits. 

ABDOMEN:  Soft.  Abdomen is nondistended.  Abdomen is nontender.  No organomegaly or

masses. 



RECOMMENDATIONS:  Continue IV fluids.  Continue Zofran for nausea.  He has completed

a course of Mucomyst, and he needs just to watch for liver function test.  He has

supportive care and possibly psychiatric evaluation.  We will sign off from today.

If there are any new changes, please call the Gastroenterology service. 







Job ID:  [id number]

## 2019-12-30 VITALS — TEMPERATURE: 98.4 F | DIASTOLIC BLOOD PRESSURE: 62 MMHG | SYSTOLIC BLOOD PRESSURE: 132 MMHG

## 2019-12-30 LAB
ALBUMIN SERPL BCG-MCNC: 3.7 G/DL (ref 3.5–5)
ALP SERPL-CCNC: 49 U/L (ref 40–110)
ALT SERPL W P-5'-P-CCNC: 197 U/L (ref 8–55)
ANION GAP SERPL CALC-SCNC: 11 MMOL/L (ref 10–20)
APTT PPP: 31.4 SEC (ref 22.9–36.1)
AST SERPL-CCNC: 33 U/L (ref 5–34)
BILIRUB SERPL-MCNC: 0.8 MG/DL (ref 0.2–1.2)
BUN SERPL-MCNC: 5 MG/DL (ref 8.9–20.6)
CALCIUM SERPL-MCNC: 8.7 MG/DL (ref 7.8–10.44)
CHLORIDE SERPL-SCNC: 106 MMOL/L (ref 98–107)
CO2 SERPL-SCNC: 27 MMOL/L (ref 22–29)
CREAT CL PREDICTED SERPL C-G-VRATE: 116 ML/MIN (ref 70–130)
GLOBULIN SER CALC-MCNC: 2.5 G/DL (ref 2.4–3.5)
GLUCOSE SERPL-MCNC: 102 MG/DL (ref 70–105)
INR PPP: 1
POTASSIUM SERPL-SCNC: 3.2 MMOL/L (ref 3.5–5.1)
PROTHROMBIN TIME: 13.2 SEC (ref 12–14.7)
SODIUM SERPL-SCNC: 141 MMOL/L (ref 136–145)

## 2019-12-30 RX ADMIN — POTASSIUM CHLORIDE, DEXTROSE MONOHYDRATE AND SODIUM CHLORIDE SCH MLS: 150; 5; 450 INJECTION, SOLUTION INTRAVENOUS at 13:10

## 2019-12-30 RX ADMIN — THERA TABS SCH TAB: TAB at 08:29

## 2019-12-30 RX ADMIN — FAMOTIDINE SCH: 10 INJECTION, SOLUTION INTRAVENOUS at 09:00

## 2019-12-30 RX ADMIN — POTASSIUM CHLORIDE, DEXTROSE MONOHYDRATE AND SODIUM CHLORIDE SCH MLS: 150; 5; 450 INJECTION, SOLUTION INTRAVENOUS at 03:20

## 2019-12-30 NOTE — PDOC.HOSPP
- Subjective


Encounter Date: 12/30/19


Encounter Time: 12:30


Subjective: 





Mr. Pimentel was seen today in follow-up of suicide attempt. He notes some nausea, 

but he tells me his parents came to visit him yesterday, and brought him some 

food from home, and he was able to eat this and keep it down.





- Objective


Vital Signs & Weight: 


 Vital Signs (12 hours)











  Temp Pulse Resp BP Pulse Ox


 


 12/30/19 08:00  98.4 F  74  18  132/62  98








 Weight











Admit Weight                   125 lb 0.034 oz


 


Weight                         125 lb 0.034 oz














Result Diagrams: 


 12/28/19 06:07





 12/30/19 04:27





Hospitalist ROS





- Medication


Medications: 


Active Medications











Generic Name Dose Route Start Last Admin





  Trade Name Freq  PRN Reason Stop Dose Admin


 


Calcium Carbonate  1,000 mg  12/26/19 08:18  12/26/19 17:17





  Tums  PO   1,000 mg





  Q4H PRN   Administration





  Heartburn  or Indigestion   





     





     





     


 


Docusate Sodium  100 mg  12/27/19 21:00  12/30/19 08:29





  Colace  PO   100 mg





  BID RUBY   Administration





     





     





     





     


 


Famotidine  20 mg  12/26/19 09:00  12/29/19 20:29





  Pepcid  SLOW IVP   Not Given





  Q12HR RUBY   





     





     





     





     


 


Famotidine  20 mg  12/26/19 09:00  12/30/19 08:29





  Pepcid  PO   20 mg





  BID RUBY   Administration





     





     





     





     


 


Folic Acid  1 mg  12/26/19 09:00  12/30/19 08:29





  Folvite  PO   1 mg





  DAILY RUBY   Administration





     





     





     





     


 


Promethazine HCl 25 mg/ Sodium  51 mls @ 102 mls/hr  12/26/19 08:22  12/27/19 10

:33





  Chloride  IVPB   51 mls





  Q6H PRN   Administration





  Nausea   





     





     





     


 


Potassium Chloride/Dextrose/Sod Cl  1,000 mls @ 100 mls/hr  12/29/19 06:30  12/ 30/19 03:20





  D5 1/2 Ns W/20 Meq Kcl  IV   1,000 mls





  .Q10H RUBY   Administration





     





     





     





     


 


Multivitamins  1 tab  12/26/19 09:00  12/30/19 08:29





  Theragran  PO   1 tab





  DAILY RUBY   Administration





     





     





     





     


 


Ondansetron HCl  4 mg  12/26/19 08:18  12/28/19 03:06





  Zofran Odt  PO   4 mg





  Q6H PRN   Administration





  Nausea/Vomiting   





     





     





     


 


Ondansetron HCl  4 mg  12/26/19 08:18  12/29/19 08:56





  Zofran  IVP   4 mg





  Q6H PRN   Administration





  Nausea/Vomiting   





     





     





     


 


Promethazine HCl  25 mg  12/26/19 08:22  12/30/19 03:19





  Phenergan  PO   25 mg





  Q4H PRN   Administration





  Nausea   





     





     





     


 


Tramadol HCl  50 mg  12/26/19 13:33  12/29/19 01:32





  Ultram  PO   50 mg





  Q8H PRN   Administration





  Moderate Pain (4-6)   





     





     





     














- Exam


Eye: PERRL


Heart: RRR, no murmur, no gallops, no rubs, normal peripheral pulses


Respiratory: CTAB, no wheezes, no rales, no ronchi, normal chest expansion


Gastrointestinal: soft (+ mild tenderness), non-distended, normal bowel sounds, 

no palpable masses, no hepatomegaly


Extremities: no cyanosis, no clubbing, no edema


Musculoskeletal: no muscle wasting


Psychiatric: normal affect, normal behavior, A&O x 3





Hosp A/P


(1) Acetaminophen overdose


Code(s): T39.1X1A - POISONING BY 4-AMINOPHENOL DERIVATIVES, ACCIDENTAL, INIT   

Status: Acute   





(2) Depression


Code(s): F32.9 - MAJOR DEPRESSIVE DISORDER, SINGLE EPISODE, UNSPECIFIED   Status

: Chronic   





- Plan





* Suicide attempt with Acetaminophen overdose


* He has completed the course of mucomyst


* Liver function tests are  essentially back to normal


* He is tolerating solid food


* He is medically cleared for discharge


* Will consult [facility] for a safe discharge plan.

## 2019-12-30 NOTE — DIS
DATE OF ADMISSION:  12/26/2019



DATE OF DISCHARGE:  12/30/2019



DISCHARGE DISPOSITION:  Home in the care of his mother.



DISCHARGE DIAGNOSES:  

1. Acetaminophen overdose.

2. Hepatitis secondary to #1.

3. Depression.



DISCHARGE MEDICATIONS:  None.



CODE STATUS:  Full code.



ALLERGIES:  NO KNOWN DRUG ALLERGIES.



HOSPITAL COURSE:  Mr. Pimentel is a pleasant 19-year-old gentleman, who has a history

of suicide attempt in the past.  He had an argument with his fiancee over the

holidays and got depressed and took 08943 mg of Tylenol in an attempt to kill

himself.  Shortly after taking the medication, he started having nausea and

vomiting.  He began to rethink things and came to the emergency room for evaluation.

 His Tylenol level was elevated and he was started on acetylcysteine.  It was noted

that he did have an elevation in his transaminases as well as his bilirubin.  For

this reason, GI was consulted.  The full IV dose of Mucomyst was administered and

the patient's liver function tests have essentially trended back toward normal.  At

the time of discharge, his bilirubin was back to normal at 0.8, AST 33, ALT of 197,

and his INR was 1.0.  [facility] was consulted prior to discharge and the counselor from

[facility] met with the patient as well as the patient's mother and a safe discharge plan

was outlined.  They 

would give him a well check tomorrow.  He will be discharged in the care of his

mother.  All weapons in the house and the patient's mother's boyfriend weapons will

be locked up or taken out of the home as well as all medications will be placed out

of the patient's reach and he will subsequently be discharged home and will need

close outpatient followup with [facility], and they will be handling that post discharge. 





Job ID:  [id number]